# Patient Record
Sex: MALE | Race: WHITE | NOT HISPANIC OR LATINO | Employment: FULL TIME | URBAN - METROPOLITAN AREA
[De-identification: names, ages, dates, MRNs, and addresses within clinical notes are randomized per-mention and may not be internally consistent; named-entity substitution may affect disease eponyms.]

---

## 2023-04-12 PROBLEM — R53.83 MALAISE AND FATIGUE: Status: ACTIVE | Noted: 2023-04-12

## 2023-04-12 PROBLEM — E78.5 DYSLIPIDEMIA: Status: ACTIVE | Noted: 2023-04-12

## 2023-04-12 PROBLEM — R79.89 ABNORMAL LFTS: Status: ACTIVE | Noted: 2023-04-12

## 2023-04-12 PROBLEM — S76.219A INGUINAL STRAIN, INITIAL ENCOUNTER: Status: ACTIVE | Noted: 2023-04-12

## 2023-04-12 PROBLEM — E55.9 VITAMIN D DEFICIENCY: Status: ACTIVE | Noted: 2023-04-12

## 2023-04-12 PROBLEM — R53.81 MALAISE AND FATIGUE: Status: ACTIVE | Noted: 2023-04-12

## 2023-04-12 PROBLEM — M79.2 NEURITIS: Status: ACTIVE | Noted: 2023-04-12

## 2023-04-12 PROBLEM — N45.1 EPIDIDYMITIS: Status: ACTIVE | Noted: 2023-04-12

## 2023-04-12 PROBLEM — E78.00 HYPERCHOLESTEREMIA: Status: ACTIVE | Noted: 2023-04-12

## 2023-04-12 PROBLEM — M79.10 MYALGIA: Status: ACTIVE | Noted: 2023-04-12

## 2023-04-12 PROBLEM — R10.32 LEFT GROIN PAIN: Status: ACTIVE | Noted: 2023-04-12

## 2023-04-12 PROBLEM — N50.819 PERSISTENT TESTICULAR PAIN: Status: ACTIVE | Noted: 2023-04-12

## 2023-04-12 PROBLEM — R07.89 ATYPICAL CHEST PAIN: Status: ACTIVE | Noted: 2023-04-12

## 2023-04-12 PROBLEM — M54.17 LUMBOSACRAL NEURITIS: Status: ACTIVE | Noted: 2023-04-12

## 2023-04-12 PROBLEM — M19.90 DJD (DEGENERATIVE JOINT DISEASE): Status: ACTIVE | Noted: 2023-04-12

## 2023-04-12 RX ORDER — ERGOCALCIFEROL 1.25 MG/1
1 CAPSULE ORAL
COMMUNITY
Start: 2018-02-13

## 2023-04-12 RX ORDER — COLESEVELAM 180 1/1
2 TABLET ORAL 2 TIMES DAILY
COMMUNITY
Start: 2016-03-08

## 2023-04-13 ENCOUNTER — OFFICE VISIT (OUTPATIENT)
Dept: PRIMARY CARE | Facility: CLINIC | Age: 58
End: 2023-04-13
Payer: COMMERCIAL

## 2023-04-13 VITALS
BODY MASS INDEX: 22.4 KG/M2 | SYSTOLIC BLOOD PRESSURE: 110 MMHG | DIASTOLIC BLOOD PRESSURE: 72 MMHG | OXYGEN SATURATION: 99 % | HEIGHT: 73 IN | HEART RATE: 67 BPM | TEMPERATURE: 98 F | WEIGHT: 169 LBS | RESPIRATION RATE: 16 BRPM

## 2023-04-13 DIAGNOSIS — Z01.818 PRE-OP TESTING: ICD-10-CM

## 2023-04-13 DIAGNOSIS — Z01.818 PREOP GENERAL PHYSICAL EXAM: Primary | ICD-10-CM

## 2023-04-13 DIAGNOSIS — M77.8 SHOULDER CAPSULITIS, RIGHT: ICD-10-CM

## 2023-04-13 PROCEDURE — 93000 ELECTROCARDIOGRAM COMPLETE: CPT | Performed by: FAMILY MEDICINE

## 2023-04-13 PROCEDURE — 1036F TOBACCO NON-USER: CPT | Performed by: FAMILY MEDICINE

## 2023-04-13 PROCEDURE — 85025 COMPLETE CBC W/AUTO DIFF WBC: CPT

## 2023-04-13 PROCEDURE — 80053 COMPREHEN METABOLIC PANEL: CPT

## 2023-04-13 PROCEDURE — 99396 PREV VISIT EST AGE 40-64: CPT | Performed by: FAMILY MEDICINE

## 2023-04-13 ASSESSMENT — PAIN SCALES - GENERAL: PAINLEVEL: 0-NO PAIN

## 2023-04-13 NOTE — PROGRESS NOTES
Subjective   Preston Lindsay is a 58 y.o. male who presents for Follow-up and Pre-op Visit (Patient will have right shoulder surgery 5/2/2023 with Dr. Alicea).    HPI  ; patient is a relatively healthy 58-year-old male who who is scheduled for right shoulder surgery on May 2, 2023.  He has right biceps tendinosis, some spurring and partial tear of the right rotator cuff.  He has a preop physical form that needs to be completed today.  Patient will have a EKG done, he will also have his blood work drawn and a physical exam.  He really does not take any medications.  He does take some vitamins and supplements.  He previously was a runner but he has quit running recently due to his age and lack of time.  Patient is allergic to Bactrim.  He denies any tobacco usage and has social alcohol usage.  He does have abduction pain and weakness with the right shoulder.  He also has pain at night when he tries to sleep and roll over on his shoulder.      Objective ROS  ;10 systems were reviewed and the information is included in the HPI and no additional review of systems is indicated.    Physical Exam  Vitals and nursing note reviewed.   Constitutional:       Appearance: Normal appearance. He is normal weight.      Comments: Patient is alert and oriented x3, no distress.   HENT:      Head: Normocephalic.      Right Ear: Tympanic membrane and external ear normal.      Left Ear: Tympanic membrane and external ear normal.      Nose: Nose normal.      Mouth/Throat:      Mouth: Mucous membranes are moist.      Pharynx: Oropharynx is clear.      Comments: Mouth is moist,  tongue is midline, no posterior pharyngeal erythema.  Eyes:      Extraocular Movements: Extraocular movements intact.      Conjunctiva/sclera: Conjunctivae normal.      Pupils: Pupils are equal, round, and reactive to light.   Neck:      Comments: Occasional neck spasm and restriction of motion secondary to stress and tension.  Cardiovascular:      Rate and Rhythm:  Normal rate and regular rhythm.      Pulses: Normal pulses.      Heart sounds: Normal heart sounds.      Comments: Heart rhythm is stable S1 and S2 are noted, no ectopics.  Denies any chest pain or palpitations.  ECG was normal.  Pulmonary:      Effort: Pulmonary effort is normal.      Comments: Lungs are clear to auscultation.  No coughing or wheezing.  Abdominal:      General: Abdomen is flat. Bowel sounds are normal.      Palpations: Abdomen is soft.      Comments: Abdomen is soft and nontender, no hepatosplenomegaly.  No guarding and no rebound, no flank tenderness.   Genitourinary:     Comments: Not examined  Musculoskeletal:         General: Normal range of motion.      Cervical back: Normal range of motion.      Comments: Patient has right shoulder abduction weakness and pain with range of motion testing.   Skin:     General: Skin is warm.   Neurological:      General: No focal deficit present.      Mental Status: He is alert and oriented to person, place, and time. Mental status is at baseline.      Comments: No neurosensory deficits are noted.  Normal reflexes upper and lower extremities.   Psychiatric:         Mood and Affect: Mood normal.         Behavior: Behavior normal.         Thought Content: Thought content normal.         Judgment: Judgment normal.      Comments: Normal mood and affect, normal thought content and normal judgment.      PLAN  ; patient is a 58-year-old male who is having right shoulder surgery on May 2, 2023.  He had a normal physical exam today.  The H&P form was completed.  His EKG is normal.  He had his blood work drawn and we will evaluate that when he returns tomorrow.  Patient will be notified of his blood results and further recommendations will be made at that time.  We will fax the H&P form back to the surgeon.      Problem List Items Addressed This Visit    None  Visit Diagnoses       Pre-op testing        Relevant Orders    CBC and Auto Differential    ECG 12 lead (Clinic  Performed)    Comprehensive Metabolic Panel    CBC and Auto Differential                 Curtis Smith DO

## 2023-04-14 LAB
ALANINE AMINOTRANSFERASE (SGPT) (U/L) IN SER/PLAS: 24 U/L (ref 10–52)
ALBUMIN (G/DL) IN SER/PLAS: 4.8 G/DL (ref 3.4–5)
ALKALINE PHOSPHATASE (U/L) IN SER/PLAS: 65 U/L (ref 33–136)
ANION GAP IN SER/PLAS: 14 MMOL/L (ref 10–20)
ASPARTATE AMINOTRANSFERASE (SGOT) (U/L) IN SER/PLAS: 19 U/L (ref 9–39)
BASOPHILS (10*3/UL) IN BLOOD BY AUTOMATED COUNT: 0.06 X10E9/L (ref 0–0.1)
BASOPHILS/100 LEUKOCYTES IN BLOOD BY AUTOMATED COUNT: 1.1 % (ref 0–2)
BILIRUBIN TOTAL (MG/DL) IN SER/PLAS: 0.9 MG/DL (ref 0–1.2)
CALCIUM (MG/DL) IN SER/PLAS: 10.1 MG/DL (ref 8.6–10.6)
CARBON DIOXIDE, TOTAL (MMOL/L) IN SER/PLAS: 29 MMOL/L (ref 21–32)
CHLORIDE (MMOL/L) IN SER/PLAS: 102 MMOL/L (ref 98–107)
CREATININE (MG/DL) IN SER/PLAS: 0.88 MG/DL (ref 0.5–1.3)
EOSINOPHILS (10*3/UL) IN BLOOD BY AUTOMATED COUNT: 0.12 X10E9/L (ref 0–0.7)
EOSINOPHILS/100 LEUKOCYTES IN BLOOD BY AUTOMATED COUNT: 2.3 % (ref 0–6)
ERYTHROCYTE DISTRIBUTION WIDTH (RATIO) BY AUTOMATED COUNT: 13.5 % (ref 11.5–14.5)
ERYTHROCYTE MEAN CORPUSCULAR HEMOGLOBIN CONCENTRATION (G/DL) BY AUTOMATED: 31 G/DL (ref 32–36)
ERYTHROCYTE MEAN CORPUSCULAR VOLUME (FL) BY AUTOMATED COUNT: 95 FL (ref 80–100)
ERYTHROCYTES (10*6/UL) IN BLOOD BY AUTOMATED COUNT: 4.76 X10E12/L (ref 4.5–5.9)
GFR MALE: >90 ML/MIN/1.73M2
GLUCOSE (MG/DL) IN SER/PLAS: 80 MG/DL (ref 74–99)
HEMATOCRIT (%) IN BLOOD BY AUTOMATED COUNT: 45.1 % (ref 41–52)
HEMOGLOBIN (G/DL) IN BLOOD: 14 G/DL (ref 13.5–17.5)
IMMATURE GRANULOCYTES/100 LEUKOCYTES IN BLOOD BY AUTOMATED COUNT: 0.2 % (ref 0–0.9)
LEUKOCYTES (10*3/UL) IN BLOOD BY AUTOMATED COUNT: 5.2 X10E9/L (ref 4.4–11.3)
LYMPHOCYTES (10*3/UL) IN BLOOD BY AUTOMATED COUNT: 2.05 X10E9/L (ref 1.2–4.8)
LYMPHOCYTES/100 LEUKOCYTES IN BLOOD BY AUTOMATED COUNT: 39.1 % (ref 13–44)
MONOCYTES (10*3/UL) IN BLOOD BY AUTOMATED COUNT: 0.45 X10E9/L (ref 0.1–1)
MONOCYTES/100 LEUKOCYTES IN BLOOD BY AUTOMATED COUNT: 8.6 % (ref 2–10)
NEUTROPHILS (10*3/UL) IN BLOOD BY AUTOMATED COUNT: 2.55 X10E9/L (ref 1.2–7.7)
NEUTROPHILS/100 LEUKOCYTES IN BLOOD BY AUTOMATED COUNT: 48.7 % (ref 40–80)
NRBC (PER 100 WBCS) BY AUTOMATED COUNT: 0 /100 WBC (ref 0–0)
PLATELETS (10*3/UL) IN BLOOD AUTOMATED COUNT: 233 X10E9/L (ref 150–450)
POTASSIUM (MMOL/L) IN SER/PLAS: 4.3 MMOL/L (ref 3.5–5.3)
PROTEIN TOTAL: 7 G/DL (ref 6.4–8.2)
SODIUM (MMOL/L) IN SER/PLAS: 141 MMOL/L (ref 136–145)
UREA NITROGEN (MG/DL) IN SER/PLAS: 18 MG/DL (ref 6–23)

## 2023-04-16 NOTE — RESULT ENCOUNTER NOTE
Red and white blood cell counts are normal     blood sugar kidney and liver function are normal         blood work looks stable for his upcoming shoulder surgery.   blood work and EKG need to be faxed to the surgeon.

## 2024-04-22 DIAGNOSIS — N50.812 PAIN IN LEFT TESTICLE: ICD-10-CM

## 2025-04-02 ENCOUNTER — APPOINTMENT (OUTPATIENT)
Dept: PRIMARY CARE | Facility: CLINIC | Age: 60
End: 2025-04-02
Payer: COMMERCIAL

## 2025-04-02 VITALS
TEMPERATURE: 97.5 F | RESPIRATION RATE: 16 BRPM | OXYGEN SATURATION: 92 % | BODY MASS INDEX: 24.12 KG/M2 | WEIGHT: 182 LBS | HEIGHT: 73 IN | DIASTOLIC BLOOD PRESSURE: 80 MMHG | HEART RATE: 66 BPM | SYSTOLIC BLOOD PRESSURE: 110 MMHG

## 2025-04-02 DIAGNOSIS — Z12.11 COLON CANCER SCREENING: ICD-10-CM

## 2025-04-02 DIAGNOSIS — Z23 NEED FOR DIPHTHERIA-TETANUS-PERTUSSIS (TDAP) VACCINE: ICD-10-CM

## 2025-04-02 DIAGNOSIS — E78.5 DYSLIPIDEMIA: ICD-10-CM

## 2025-04-02 DIAGNOSIS — E55.9 VITAMIN D DEFICIENCY: ICD-10-CM

## 2025-04-02 DIAGNOSIS — Z00.00 PHYSICAL EXAM, ANNUAL: Primary | ICD-10-CM

## 2025-04-02 DIAGNOSIS — R79.89 LOW TESTOSTERONE: ICD-10-CM

## 2025-04-02 LAB
APPEARANCE UR: CLEAR
BILIRUB UR QL STRIP: NEGATIVE
COLOR UR: YELLOW
GLUCOSE UR STRIP-MCNC: NEGATIVE MG/DL
HGB UR QL STRIP: NEGATIVE
KETONES UR STRIP-MCNC: NEGATIVE MG/DL
LEUKOCYTE ESTERASE UR QL STRIP: NEGATIVE
NITRITE UR QL STRIP: NEGATIVE
PH UR STRIP: 6 [PH]
PROT UR STRIP-MCNC: NEGATIVE MG/DL
SP GR UR STRIP.AUTO: 1.01
UROBILINOGEN UR STRIP-ACNC: 0.2 E.U./DL

## 2025-04-02 PROCEDURE — 1036F TOBACCO NON-USER: CPT | Performed by: FAMILY MEDICINE

## 2025-04-02 PROCEDURE — 81003 URINALYSIS AUTO W/O SCOPE: CPT | Performed by: FAMILY MEDICINE

## 2025-04-02 PROCEDURE — 93000 ELECTROCARDIOGRAM COMPLETE: CPT | Performed by: FAMILY MEDICINE

## 2025-04-02 PROCEDURE — 90715 TDAP VACCINE 7 YRS/> IM: CPT | Performed by: FAMILY MEDICINE

## 2025-04-02 PROCEDURE — 90471 IMMUNIZATION ADMIN: CPT | Performed by: FAMILY MEDICINE

## 2025-04-02 PROCEDURE — 99396 PREV VISIT EST AGE 40-64: CPT | Performed by: FAMILY MEDICINE

## 2025-04-02 PROCEDURE — 3008F BODY MASS INDEX DOCD: CPT | Performed by: FAMILY MEDICINE

## 2025-04-02 RX ORDER — OMEPRAZOLE 40 MG/1
40 CAPSULE, DELAYED RELEASE ORAL
COMMUNITY

## 2025-04-02 ASSESSMENT — PAIN SCALES - GENERAL: PAINLEVEL_OUTOF10: 0-NO PAIN

## 2025-04-02 ASSESSMENT — PATIENT HEALTH QUESTIONNAIRE - PHQ9
SUM OF ALL RESPONSES TO PHQ9 QUESTIONS 1 AND 2: 0
1. LITTLE INTEREST OR PLEASURE IN DOING THINGS: NOT AT ALL
2. FEELING DOWN, DEPRESSED OR HOPELESS: NOT AT ALL

## 2025-04-02 NOTE — PROGRESS NOTES
Subjective   Preston Lindsay is a 60 y.o. male who presents for Annual Exam (Patient here for annual physical exam today).    HPI  : Patient is a 60-year-old male who is in for his annual physical exam.  Patient will have an ECG completed, give us a urine specimen, complete blood work and a physical exam.  He also states that he needs a tetanus shot update.  Patient is relatively healthy and stable and he does plan on retiring in 3 months.  He states there is just too much stress at work and after all these years and is time to retire.  His only new problem was some gastroesophageal reflux and apparently he did go to the ER and was given omeprazole to take daily for 2 months.    Objective  : ROS :10 systems were reviewed and the information is included in the HPI and no additional review of systems is indicated.    Physical Exam  Vitals and nursing note reviewed.   Constitutional:       Appearance: Normal appearance. He is normal weight.      Comments: Patient is alert and oriented x3.   No acute distress   HENT:      Head: Normocephalic.      Right Ear: Tympanic membrane and external ear normal.      Left Ear: Tympanic membrane and external ear normal.      Ears:      Comments: Ears are patent bilaterally and TMs are clear.     Nose: Nose normal.      Mouth/Throat:      Mouth: Mucous membranes are moist.      Pharynx: Oropharynx is clear.      Comments: Mouth is moist, tongue is midline.  No posterior pharyngeal erythema.  Eyes:      Extraocular Movements: Extraocular movements intact.      Conjunctiva/sclera: Conjunctivae normal.      Pupils: Pupils are equal, round, and reactive to light.      Comments: Patient does wear glasses and has been stable, does have his  eyes examined once a year.   Neck:      Comments: No carotid bruits, no thyromegaly, no cervical adenopathy.  Occasional neck spasm and restriction of motion secondary to stress and tension.  Cardiovascular:      Rate and Rhythm: Normal rate and regular  rhythm.      Pulses: Normal pulses.      Heart sounds: Normal heart sounds.      Comments: Patient denies chest pain and no palpitations.  Heart rhythm is stable S1 and S2 are noted, no ectopics.  Pulmonary:      Effort: Pulmonary effort is normal.      Breath sounds: Normal breath sounds.      Comments: Patient denies any coughing or wheezing.  Lungs are clear to auscultation.    Abdominal:      General: Bowel sounds are normal.      Palpations: Abdomen is soft.      Comments: Abdomen is soft , and no specific tenderness, he has had some issues with, loose bowels, and GERD symptoms.  Is taking omeprazole.  No flank tenderness.  No suprapubic pain.  Positive bowel sounds .  Patient thinks he had a colonoscopy about 5 years ago and he will do a Cologuard test this time.  No abdominal guarding and no rebound tenderness.   Genitourinary:     Comments: Patient denies dysuria, no hematuria, no nocturia, denies flank pain.  Musculoskeletal:         General: Normal range of motion.      Cervical back: Normal range of motion and neck supple.      Comments: Occasional lower back  pains and trying to stretch.   Hips and knees are stable. Age-related arthritis in the joints.  Mild restriction of motion cervical and lumbar spines due to muscle spasm.   Skin:     General: Skin is warm and dry.      Comments: There is no bruising, no erythema, no skin lesions noted, no rashes.   Neurological:      General: No focal deficit present.      Mental Status: He is alert and oriented to person, place, and time. Mental status is at baseline.      Comments: No focal neurosensory deficits are noted.  Patient denies any peripheral neuropathy.  Coordination and gait are stable.  Normal muscle strength upper and lower extremities.   Psychiatric:         Mood and Affect: Mood normal.         Behavior: Behavior normal.         Thought Content: Thought content normal.         Judgment: Judgment normal.      Comments: Stress at work  and planning  group home.  Patient has normal mood and affect.  Thought content and judgment are stable.  No signs of vascular dementia.  Behavior is normal.     PLAN : Patient is a 60-year-old male who was evaluated today for his physical exam.  Urine showed a pH of 6.0, specific gravity 1.010, negative leukocytes, negative protein, negative blood, negative glucose.  His electrocardiogram shows sinus rhythm at a rate of 70 bpm, no acute ST-T wave changes, normal ECG.  Patient will be notified of his blood results in 3 days and further recommendations will be made at that time.  He did receive his Adacel Tdap shot today.  He otherwise is stable and will follow-up pending the blood work results.    Problem List Items Addressed This Visit       Vitamin D deficiency    Relevant Orders    Vitamin D 25-Hydroxy,Total (for eval of Vitamin D levels)     Other Visit Diagnoses       Physical exam, annual        Relevant Orders    CBC    Comprehensive Metabolic Panel    Lipid Panel    Prostate Specific Antigen, Screen    Thyroid Stimulating Hormone    POCT UA (Automated) docked device    ECG 12 Lead    Low testosterone        Relevant Orders    Testosterone                 Curtis Smith, DO

## 2025-04-03 LAB
25(OH)D3+25(OH)D2 SERPL-MCNC: 32 NG/ML (ref 30–100)
ALBUMIN SERPL-MCNC: 4.8 G/DL (ref 3.6–5.1)
ALP SERPL-CCNC: 60 U/L (ref 35–144)
ALT SERPL-CCNC: 27 U/L (ref 9–46)
ANION GAP SERPL CALCULATED.4IONS-SCNC: 9 MMOL/L (CALC) (ref 7–17)
AST SERPL-CCNC: 19 U/L (ref 10–35)
BILIRUB SERPL-MCNC: 0.8 MG/DL (ref 0.2–1.2)
BUN SERPL-MCNC: 20 MG/DL (ref 7–25)
CALCIUM SERPL-MCNC: 9.5 MG/DL (ref 8.6–10.3)
CHLORIDE SERPL-SCNC: 103 MMOL/L (ref 98–110)
CHOLEST SERPL-MCNC: 260 MG/DL
CHOLEST/HDLC SERPL: 5.1 (CALC)
CO2 SERPL-SCNC: 28 MMOL/L (ref 20–32)
CREAT SERPL-MCNC: 0.93 MG/DL (ref 0.7–1.35)
EGFRCR SERPLBLD CKD-EPI 2021: 93 ML/MIN/1.73M2
ERYTHROCYTE [DISTWIDTH] IN BLOOD BY AUTOMATED COUNT: 12.6 % (ref 11–15)
GLUCOSE SERPL-MCNC: 75 MG/DL (ref 65–99)
HCT VFR BLD AUTO: 44.5 % (ref 38.5–50)
HDLC SERPL-MCNC: 51 MG/DL
HGB BLD-MCNC: 14.9 G/DL (ref 13.2–17.1)
LDLC SERPL CALC-MCNC: 184 MG/DL (CALC)
MCH RBC QN AUTO: 29.7 PG (ref 27–33)
MCHC RBC AUTO-ENTMCNC: 33.5 G/DL (ref 32–36)
MCV RBC AUTO: 88.6 FL (ref 80–100)
NONHDLC SERPL-MCNC: 209 MG/DL (CALC)
PLATELET # BLD AUTO: 243 THOUSAND/UL (ref 140–400)
PMV BLD REES-ECKER: 10.4 FL (ref 7.5–12.5)
POTASSIUM SERPL-SCNC: 4.2 MMOL/L (ref 3.5–5.3)
PROT SERPL-MCNC: 7.1 G/DL (ref 6.1–8.1)
PSA SERPL-MCNC: 0.48 NG/ML
RBC # BLD AUTO: 5.02 MILLION/UL (ref 4.2–5.8)
SODIUM SERPL-SCNC: 140 MMOL/L (ref 135–146)
TESTOST SERPL-MCNC: 369 NG/DL (ref 250–827)
TRIGL SERPL-MCNC: 121 MG/DL
TSH SERPL-ACNC: 3.28 MIU/L (ref 0.4–4.5)
WBC # BLD AUTO: 5.1 THOUSAND/UL (ref 3.8–10.8)

## 2025-04-03 NOTE — RESULT ENCOUNTER NOTE
Cholesterol is high at 260 should be under 200        he probably should go on a statin or medication to lower his cholesterol.      I know previously he was on WelChol.  Triglycerides are normal at 121         bad cholesterol is 184 and should be under 100      he can let me know if he wants to go back on medication.        Urine was clear      red and white blood cell counts are normal      platelet count is normal   blood sugar, kidney and liver function are normal         prostate level is normal 0.48 thyroid function is normal        vitamin D was normal at 32      testosterone level was 369         normal is from 2 50-8 27          if he is concerned about the testosterone I would refer him to urology.       Main thing is he has to try to get that cholesterol under better control.         Either take a statin or WelChol.

## 2025-04-07 ENCOUNTER — APPOINTMENT (OUTPATIENT)
Dept: PRIMARY CARE | Facility: CLINIC | Age: 60
End: 2025-04-07
Payer: COMMERCIAL

## 2025-04-21 LAB — NONINV COLON CA DNA+OCC BLD SCRN STL QL: NEGATIVE
